# Patient Record
Sex: FEMALE | Race: OTHER | HISPANIC OR LATINO | URBAN - METROPOLITAN AREA
[De-identification: names, ages, dates, MRNs, and addresses within clinical notes are randomized per-mention and may not be internally consistent; named-entity substitution may affect disease eponyms.]

---

## 2018-02-03 ENCOUNTER — EMERGENCY (EMERGENCY)
Facility: HOSPITAL | Age: 38
LOS: 1 days | Discharge: HOME | End: 2018-02-03
Attending: EMERGENCY MEDICINE | Admitting: EMERGENCY MEDICINE
Payer: SELF-PAY

## 2018-02-03 ENCOUNTER — INPATIENT (INPATIENT)
Facility: HOSPITAL | Age: 38
LOS: 1 days | Discharge: HOME | DRG: 742 | End: 2018-02-05
Attending: PEDIATRICS | Admitting: PEDIATRICS
Payer: SELF-PAY

## 2018-02-03 VITALS
DIASTOLIC BLOOD PRESSURE: 74 MMHG | TEMPERATURE: 98 F | RESPIRATION RATE: 16 BRPM | HEART RATE: 99 BPM | OXYGEN SATURATION: 99 % | SYSTOLIC BLOOD PRESSURE: 110 MMHG

## 2018-02-03 VITALS — SYSTOLIC BLOOD PRESSURE: 89 MMHG | DIASTOLIC BLOOD PRESSURE: 73 MMHG | HEART RATE: 89 BPM

## 2018-02-03 VITALS
SYSTOLIC BLOOD PRESSURE: 96 MMHG | RESPIRATION RATE: 16 BRPM | DIASTOLIC BLOOD PRESSURE: 58 MMHG | OXYGEN SATURATION: 98 % | HEART RATE: 90 BPM | TEMPERATURE: 98 F

## 2018-02-03 DIAGNOSIS — R10.13 EPIGASTRIC PAIN: ICD-10-CM

## 2018-02-03 DIAGNOSIS — N73.9 FEMALE PELVIC INFLAMMATORY DISEASE, UNSPECIFIED: ICD-10-CM

## 2018-02-03 DIAGNOSIS — N83.291 OTHER OVARIAN CYST, RIGHT SIDE: ICD-10-CM

## 2018-02-03 DIAGNOSIS — Z98.890 OTHER SPECIFIED POSTPROCEDURAL STATES: Chronic | ICD-10-CM

## 2018-02-03 DIAGNOSIS — K65.0 GENERALIZED (ACUTE) PERITONITIS: ICD-10-CM

## 2018-02-03 LAB
ALBUMIN SERPL ELPH-MCNC: 3.3 G/DL — SIGNIFICANT CHANGE UP (ref 3.3–5)
ALBUMIN SERPL ELPH-MCNC: 3.6 G/DL — SIGNIFICANT CHANGE UP (ref 3.4–5)
ALP SERPL-CCNC: 44 U/L — SIGNIFICANT CHANGE UP (ref 40–120)
ALP SERPL-CCNC: 60 U/L — SIGNIFICANT CHANGE UP (ref 40–120)
ALT FLD-CCNC: 11 U/L — SIGNIFICANT CHANGE UP (ref 10–45)
ALT FLD-CCNC: 19 U/L — SIGNIFICANT CHANGE UP (ref 12–42)
ANION GAP SERPL CALC-SCNC: 10 MMOL/L — SIGNIFICANT CHANGE UP (ref 9–16)
ANION GAP SERPL CALC-SCNC: 15 MMOL/L — SIGNIFICANT CHANGE UP (ref 5–17)
APPEARANCE UR: CLEAR — SIGNIFICANT CHANGE UP
APTT BLD: 26.8 SEC — LOW (ref 27.5–36.5)
AST SERPL-CCNC: 14 U/L — SIGNIFICANT CHANGE UP (ref 10–40)
AST SERPL-CCNC: 16 U/L — SIGNIFICANT CHANGE UP (ref 15–37)
BASE EXCESS BLDV CALC-SCNC: -3.6 MMOL/L — SIGNIFICANT CHANGE UP
BASOPHILS NFR BLD AUTO: 0.1 % — SIGNIFICANT CHANGE UP (ref 0–2)
BILIRUB SERPL-MCNC: 0.7 MG/DL — SIGNIFICANT CHANGE UP (ref 0.2–1.2)
BILIRUB SERPL-MCNC: 0.8 MG/DL — SIGNIFICANT CHANGE UP (ref 0.2–1.2)
BILIRUB UR-MCNC: NEGATIVE — SIGNIFICANT CHANGE UP
BLD GP AB SCN SERPL QL: NEGATIVE — SIGNIFICANT CHANGE UP
BUN SERPL-MCNC: 11 MG/DL — SIGNIFICANT CHANGE UP (ref 7–23)
BUN SERPL-MCNC: 4 MG/DL — LOW (ref 7–23)
CALCIUM SERPL-MCNC: 7.8 MG/DL — LOW (ref 8.4–10.5)
CALCIUM SERPL-MCNC: 9.7 MG/DL — SIGNIFICANT CHANGE UP (ref 8.5–10.5)
CHLORIDE SERPL-SCNC: 103 MMOL/L — SIGNIFICANT CHANGE UP (ref 96–108)
CHLORIDE SERPL-SCNC: 99 MMOL/L — SIGNIFICANT CHANGE UP (ref 96–108)
CO2 SERPL-SCNC: 21 MMOL/L — LOW (ref 22–31)
CO2 SERPL-SCNC: 26 MMOL/L — SIGNIFICANT CHANGE UP (ref 22–31)
COLOR SPEC: YELLOW — SIGNIFICANT CHANGE UP
CREAT SERPL-MCNC: 0.67 MG/DL — SIGNIFICANT CHANGE UP (ref 0.5–1.3)
CREAT SERPL-MCNC: 0.81 MG/DL — SIGNIFICANT CHANGE UP (ref 0.5–1.3)
DIFF PNL FLD: (no result)
EOSINOPHIL NFR BLD AUTO: 0.1 % — SIGNIFICANT CHANGE UP (ref 0–6)
GAS PNL BLDV: SIGNIFICANT CHANGE UP
GLUCOSE SERPL-MCNC: 113 MG/DL — HIGH (ref 70–99)
GLUCOSE SERPL-MCNC: 131 MG/DL — HIGH (ref 70–99)
GLUCOSE UR QL: NEGATIVE — SIGNIFICANT CHANGE UP
HCG SERPL-ACNC: <.1 MIU/ML — SIGNIFICANT CHANGE UP
HCG UR QL: NEGATIVE — SIGNIFICANT CHANGE UP
HCO3 BLDV-SCNC: 22 MMOL/L — SIGNIFICANT CHANGE UP (ref 20–27)
HCT VFR BLD CALC: 32.5 % — LOW (ref 34.5–45)
HCT VFR BLD CALC: 38 % — SIGNIFICANT CHANGE UP (ref 34.5–45)
HGB BLD-MCNC: 11 G/DL — LOW (ref 11.5–15.5)
HGB BLD-MCNC: 12.6 G/DL — SIGNIFICANT CHANGE UP (ref 11.5–15.5)
INR BLD: 1.27 — HIGH (ref 0.88–1.16)
KETONES UR-MCNC: NEGATIVE — SIGNIFICANT CHANGE UP
LACTATE SERPL-SCNC: 0.8 MMOL/L — SIGNIFICANT CHANGE UP (ref 0.4–2)
LACTATE SERPL-SCNC: 1 MMOL/L — SIGNIFICANT CHANGE UP (ref 0.4–2)
LACTATE SERPL-SCNC: 1.6 MMOL/L — SIGNIFICANT CHANGE UP (ref 0.5–2)
LEUKOCYTE ESTERASE UR-ACNC: (no result)
LIDOCAIN IGE QN: 31 U/L — SIGNIFICANT CHANGE UP (ref 7–60)
LIDOCAIN IGE QN: 89 U/L — SIGNIFICANT CHANGE UP (ref 73–393)
LYMPHOCYTES # BLD AUTO: 11.1 % — LOW (ref 13–44)
LYMPHOCYTES # BLD AUTO: 4 % — LOW (ref 13–44)
MAGNESIUM SERPL-MCNC: 1.9 MG/DL — SIGNIFICANT CHANGE UP (ref 1.6–2.6)
MCHC RBC-ENTMCNC: 29 PG — SIGNIFICANT CHANGE UP (ref 27–34)
MCHC RBC-ENTMCNC: 29.3 PG — SIGNIFICANT CHANGE UP (ref 27–34)
MCHC RBC-ENTMCNC: 33.2 G/DL — SIGNIFICANT CHANGE UP (ref 32–36)
MCHC RBC-ENTMCNC: 33.8 G/DL — SIGNIFICANT CHANGE UP (ref 32–36)
MCV RBC AUTO: 86.7 FL — SIGNIFICANT CHANGE UP (ref 80–100)
MCV RBC AUTO: 87.4 FL — SIGNIFICANT CHANGE UP (ref 80–100)
MONOCYTES NFR BLD AUTO: 4.9 % — SIGNIFICANT CHANGE UP (ref 2–14)
MONOCYTES NFR BLD AUTO: 5 % — SIGNIFICANT CHANGE UP (ref 2–14)
NEUTROPHILS NFR BLD AUTO: 26 % — LOW (ref 43–77)
NEUTROPHILS NFR BLD AUTO: 83.8 % — HIGH (ref 43–77)
NITRITE UR-MCNC: NEGATIVE — SIGNIFICANT CHANGE UP
PCO2 BLDV: 39 MMHG — LOW (ref 41–51)
PH BLDV: 7.36 — SIGNIFICANT CHANGE UP (ref 7.32–7.43)
PH UR: 6 — SIGNIFICANT CHANGE UP (ref 5–8)
PLATELET # BLD AUTO: 233 K/UL — SIGNIFICANT CHANGE UP (ref 150–400)
PLATELET # BLD AUTO: 283 K/UL — SIGNIFICANT CHANGE UP (ref 150–400)
PO2 BLDV: 43 MMHG — SIGNIFICANT CHANGE UP
POTASSIUM SERPL-MCNC: 3.6 MMOL/L — SIGNIFICANT CHANGE UP (ref 3.5–5.3)
POTASSIUM SERPL-MCNC: 3.8 MMOL/L — SIGNIFICANT CHANGE UP (ref 3.5–5.3)
POTASSIUM SERPL-SCNC: 3.6 MMOL/L — SIGNIFICANT CHANGE UP (ref 3.5–5.3)
POTASSIUM SERPL-SCNC: 3.8 MMOL/L — SIGNIFICANT CHANGE UP (ref 3.5–5.3)
PROT SERPL-MCNC: 6.7 G/DL — SIGNIFICANT CHANGE UP (ref 6–8.3)
PROT SERPL-MCNC: 8.3 G/DL — HIGH (ref 6.4–8.2)
PROT UR-MCNC: NEGATIVE MG/DL — SIGNIFICANT CHANGE UP
PROTHROM AB SERPL-ACNC: 14 SEC — HIGH (ref 9.8–12.7)
RBC # BLD: 3.75 M/UL — LOW (ref 3.8–5.2)
RBC # BLD: 4.35 M/UL — SIGNIFICANT CHANGE UP (ref 3.8–5.2)
RBC # FLD: 12.5 % — SIGNIFICANT CHANGE UP (ref 10.3–16.9)
RBC # FLD: 13.2 % — SIGNIFICANT CHANGE UP (ref 10.3–16.9)
RH IG SCN BLD-IMP: POSITIVE — SIGNIFICANT CHANGE UP
SAO2 % BLDV: 74 % — SIGNIFICANT CHANGE UP
SODIUM SERPL-SCNC: 135 MMOL/L — SIGNIFICANT CHANGE UP (ref 132–145)
SODIUM SERPL-SCNC: 139 MMOL/L — SIGNIFICANT CHANGE UP (ref 135–145)
SP GR SPEC: 1.01 — SIGNIFICANT CHANGE UP (ref 1–1.03)
UROBILINOGEN FLD QL: 0.2 E.U./DL — SIGNIFICANT CHANGE UP
WBC # BLD: 13.7 K/UL — HIGH (ref 3.8–10.5)
WBC # BLD: 14.7 K/UL — HIGH (ref 3.8–10.5)
WBC # FLD AUTO: 13.7 K/UL — HIGH (ref 3.8–10.5)
WBC # FLD AUTO: 14.7 K/UL — HIGH (ref 3.8–10.5)

## 2018-02-03 PROCEDURE — 99285 EMERGENCY DEPT VISIT HI MDM: CPT | Mod: 25

## 2018-02-03 PROCEDURE — 71045 X-RAY EXAM CHEST 1 VIEW: CPT | Mod: 26

## 2018-02-03 PROCEDURE — 76830 TRANSVAGINAL US NON-OB: CPT | Mod: 26

## 2018-02-03 PROCEDURE — 99053 MED SERV 10PM-8AM 24 HR FAC: CPT

## 2018-02-03 PROCEDURE — 99285 EMERGENCY DEPT VISIT HI MDM: CPT

## 2018-02-03 PROCEDURE — 74177 CT ABD & PELVIS W/CONTRAST: CPT | Mod: 26

## 2018-02-03 PROCEDURE — 44970 LAPAROSCOPY APPENDECTOMY: CPT

## 2018-02-03 RX ORDER — MORPHINE SULFATE 50 MG/1
4 CAPSULE, EXTENDED RELEASE ORAL ONCE
Qty: 0 | Refills: 0 | Status: DISCONTINUED | OUTPATIENT
Start: 2018-02-03 | End: 2018-02-03

## 2018-02-03 RX ORDER — METRONIDAZOLE 500 MG
500 TABLET ORAL EVERY 8 HOURS
Qty: 0 | Refills: 0 | Status: DISCONTINUED | OUTPATIENT
Start: 2018-02-03 | End: 2018-02-05

## 2018-02-03 RX ORDER — MORPHINE SULFATE 50 MG/1
4 CAPSULE, EXTENDED RELEASE ORAL
Qty: 0 | Refills: 0 | Status: DISCONTINUED | OUTPATIENT
Start: 2018-02-03 | End: 2018-02-04

## 2018-02-03 RX ORDER — ACETAMINOPHEN 500 MG
650 TABLET ORAL ONCE
Qty: 0 | Refills: 0 | Status: COMPLETED | OUTPATIENT
Start: 2018-02-03 | End: 2018-02-03

## 2018-02-03 RX ORDER — SODIUM CHLORIDE 9 MG/ML
1000 INJECTION INTRAMUSCULAR; INTRAVENOUS; SUBCUTANEOUS ONCE
Qty: 0 | Refills: 0 | Status: COMPLETED | OUTPATIENT
Start: 2018-02-03 | End: 2018-02-03

## 2018-02-03 RX ORDER — ONDANSETRON 8 MG/1
4 TABLET, FILM COATED ORAL ONCE
Qty: 0 | Refills: 0 | Status: DISCONTINUED | OUTPATIENT
Start: 2018-02-03 | End: 2018-02-04

## 2018-02-03 RX ORDER — SODIUM CHLORIDE 9 MG/ML
1000 INJECTION, SOLUTION INTRAVENOUS
Qty: 0 | Refills: 0 | Status: DISCONTINUED | OUTPATIENT
Start: 2018-02-03 | End: 2018-02-04

## 2018-02-03 RX ORDER — FAMOTIDINE 10 MG/ML
20 INJECTION INTRAVENOUS ONCE
Qty: 0 | Refills: 0 | Status: COMPLETED | OUTPATIENT
Start: 2018-02-03 | End: 2018-02-03

## 2018-02-03 RX ORDER — IOHEXOL 300 MG/ML
50 INJECTION, SOLUTION INTRAVENOUS ONCE
Qty: 0 | Refills: 0 | Status: COMPLETED | OUTPATIENT
Start: 2018-02-03 | End: 2018-02-03

## 2018-02-03 RX ORDER — KETOROLAC TROMETHAMINE 30 MG/ML
30 SYRINGE (ML) INJECTION ONCE
Qty: 0 | Refills: 0 | Status: DISCONTINUED | OUTPATIENT
Start: 2018-02-03 | End: 2018-02-04

## 2018-02-03 RX ORDER — PIPERACILLIN AND TAZOBACTAM 4; .5 G/20ML; G/20ML
3.38 INJECTION, POWDER, LYOPHILIZED, FOR SOLUTION INTRAVENOUS ONCE
Qty: 0 | Refills: 0 | Status: COMPLETED | OUTPATIENT
Start: 2018-02-03 | End: 2018-02-03

## 2018-02-03 RX ORDER — SODIUM CHLORIDE 9 MG/ML
1000 INJECTION INTRAMUSCULAR; INTRAVENOUS; SUBCUTANEOUS
Qty: 0 | Refills: 0 | Status: DISCONTINUED | OUTPATIENT
Start: 2018-02-03 | End: 2018-02-03

## 2018-02-03 RX ORDER — ONDANSETRON 8 MG/1
4 TABLET, FILM COATED ORAL ONCE
Qty: 0 | Refills: 0 | Status: COMPLETED | OUTPATIENT
Start: 2018-02-03 | End: 2018-02-03

## 2018-02-03 RX ORDER — ACETAMINOPHEN 500 MG
650 TABLET ORAL EVERY 6 HOURS
Qty: 0 | Refills: 0 | Status: DISCONTINUED | OUTPATIENT
Start: 2018-02-03 | End: 2018-02-05

## 2018-02-03 RX ORDER — MORPHINE SULFATE 50 MG/1
2 CAPSULE, EXTENDED RELEASE ORAL ONCE
Qty: 0 | Refills: 0 | Status: DISCONTINUED | OUTPATIENT
Start: 2018-02-03 | End: 2018-02-03

## 2018-02-03 RX ORDER — CEFOTETAN DISODIUM 1 G
2 VIAL (EA) INJECTION EVERY 12 HOURS
Qty: 0 | Refills: 0 | Status: DISCONTINUED | OUTPATIENT
Start: 2018-02-04 | End: 2018-02-05

## 2018-02-03 RX ORDER — METRONIDAZOLE 500 MG
TABLET ORAL
Qty: 0 | Refills: 0 | Status: DISCONTINUED | OUTPATIENT
Start: 2018-02-03 | End: 2018-02-03

## 2018-02-03 RX ADMIN — IOHEXOL 50 MILLILITER(S): 300 INJECTION, SOLUTION INTRAVENOUS at 06:10

## 2018-02-03 RX ADMIN — SODIUM CHLORIDE 100 MILLILITER(S): 9 INJECTION INTRAMUSCULAR; INTRAVENOUS; SUBCUTANEOUS at 15:10

## 2018-02-03 RX ADMIN — MORPHINE SULFATE 4 MILLIGRAM(S): 50 CAPSULE, EXTENDED RELEASE ORAL at 19:25

## 2018-02-03 RX ADMIN — Medication 110 MILLIGRAM(S): at 19:29

## 2018-02-03 RX ADMIN — MORPHINE SULFATE 4 MILLIGRAM(S): 50 CAPSULE, EXTENDED RELEASE ORAL at 09:48

## 2018-02-03 RX ADMIN — SODIUM CHLORIDE 1000 MILLILITER(S): 9 INJECTION INTRAMUSCULAR; INTRAVENOUS; SUBCUTANEOUS at 07:38

## 2018-02-03 RX ADMIN — ONDANSETRON 4 MILLIGRAM(S): 8 TABLET, FILM COATED ORAL at 06:10

## 2018-02-03 RX ADMIN — PIPERACILLIN AND TAZOBACTAM 200 GRAM(S): 4; .5 INJECTION, POWDER, LYOPHILIZED, FOR SOLUTION INTRAVENOUS at 09:40

## 2018-02-03 RX ADMIN — MORPHINE SULFATE 2 MILLIGRAM(S): 50 CAPSULE, EXTENDED RELEASE ORAL at 06:10

## 2018-02-03 RX ADMIN — MORPHINE SULFATE 2 MILLIGRAM(S): 50 CAPSULE, EXTENDED RELEASE ORAL at 07:23

## 2018-02-03 RX ADMIN — Medication 500 MILLIGRAM(S): at 20:02

## 2018-02-03 RX ADMIN — SODIUM CHLORIDE 1000 MILLILITER(S): 9 INJECTION INTRAMUSCULAR; INTRAVENOUS; SUBCUTANEOUS at 11:00

## 2018-02-03 RX ADMIN — SODIUM CHLORIDE 1000 MILLILITER(S): 9 INJECTION INTRAMUSCULAR; INTRAVENOUS; SUBCUTANEOUS at 06:10

## 2018-02-03 RX ADMIN — MORPHINE SULFATE 4 MILLIGRAM(S): 50 CAPSULE, EXTENDED RELEASE ORAL at 15:11

## 2018-02-03 RX ADMIN — MORPHINE SULFATE 4 MILLIGRAM(S): 50 CAPSULE, EXTENDED RELEASE ORAL at 19:29

## 2018-02-03 RX ADMIN — SODIUM CHLORIDE 1000 MILLILITER(S): 9 INJECTION INTRAMUSCULAR; INTRAVENOUS; SUBCUTANEOUS at 09:53

## 2018-02-03 RX ADMIN — MORPHINE SULFATE 4 MILLIGRAM(S): 50 CAPSULE, EXTENDED RELEASE ORAL at 15:48

## 2018-02-03 RX ADMIN — Medication 650 MILLIGRAM(S): at 09:52

## 2018-02-03 RX ADMIN — FAMOTIDINE 20 MILLIGRAM(S): 10 INJECTION INTRAVENOUS at 06:10

## 2018-02-03 NOTE — ED ADULT NURSE REASSESSMENT NOTE - NS ED NURSE REASSESS COMMENT FT1
pt cleared by MD Quach, is A+Ox3 and is okay to be transferred from [facility] to [facility] ED
Received patient from night shift RN. Patient is A,A&Ox3, in NAD and will continue to monitor. Patient reports mild HA and abdominal pain. CT result pending.

## 2018-02-03 NOTE — ED PROVIDER NOTE - OBJECTIVE STATEMENT
36 yo , transferred from [facility] , discovered suspician for intermittent ovarian torsion on imaging at [facility], pain still present but improved dfrom earlier, pt reports intermittent colicky severe rt sided abd pain for over a week. OB notified immediately on pt's arrival to ER. pt requests additional pain medications.

## 2018-02-03 NOTE — PATIENT PROFILE ADULT. - --DESCRIBE SURGICAL SITE
has 4 lap. sites on abdominal area with 3 lap. sites with gauze/tegaderm dressings( clean , dry & intact) & one over umbilicus( saturated)

## 2018-02-03 NOTE — ED PROVIDER NOTE - PROGRESS NOTE DETAILS
rechecked vitals- pt is mildly  hypotensive and febrile. Abd exam is extremely tender to all quadrants with guarding. surgical abdomen? +UTI. Sepsis protocol initiated- lactate and blood cultures and INR sent, 2nd line started- 2L NS running. tylenol and morphine given. Called CT for stat read- showing possible torsion to R ovary with 4cm cyst. Getting stat transvaginal US per Dr. Quach. Then pt likeely to be sent lights and sirens to [facility] for surgery  Additional PMHx: pt is  and has a hx of ovarian cysts- denies and vaginal discharge or urinary sxs.   States all sxs began last night. Only endorsing abd pain at this time. Nicolas N/V/D/C rechecked vitals- pt is mildly  hypotensive and febrile. Abd exam is extremely tender to all quadrants with guarding. surgical abdomen? Ruptured heidi? pan-colitis? Unclear etiology of pain- but states it is the worst at epigastrium. Pending CT result. +UTI. Sepsis protocol initiated- lactate and blood cultures and INR sent, 2nd line started- 2L NS running. tylenol and morphine given. Called CT for stat read- showing possible torsion to R ovary with 4cm cyst. Getting stat transvaginal US per Dr. Quach. Then pt likely to be sent lights and sirens to [facility] for surgery  Additional PMHx: pt is  and has a hx of ovarian cysts- denies and vaginal discharge or urinary sxs.   States all sxs began last night. Only endorsing abd pain at this time. Nicolas N/V/D/C Discussed case with Dr. Mir of OBGYN- will send pt lights and sirens to [facility] where OBGYN is expecting her. Also signed case out to Dr. Dodd ([facility])- aware of case and expecting. Main (attending note):  received sign out from night team ~ 830 am pending CT scan.  Patient with diffuse abdominal tenderness, high bandemia, high white count, infected urine (no urinary symptoms).  CT scan concern for ovarian pathology.  Obtain STAT US which shows enlarged R ovary with + flow but edematous likely from intermittent torsion.  Hx of ovarian cyst, monogamous, no hx of STIs.  Pelvic performed with R adnexal tenderness.  Spoke with GYN attending Dr. Conner and Dr. Dodd from ED for transfer and evaluation.  Symptoms improved after pain medication, fluids.  Covered with ceftriaxone.  Lactate wnl x 2 BP 89/73- running another L IVF. NO tachycardiac, tachypnea, diaphoresis. States her pain has not gotten any worse. WIll call Dr. Dodd at [facility] and make her aware that the pressure have slowly been declining and pt has not been started on any pressure- all other vitals normal. HASEEB Montoya: BP 89/73- running another L IVF. NO tachycardiac, tachypnea, diaphoresis. States her pain has not gotten any worse. WIll call Dr. Dodd at [facility] and make her aware that the pressure have slowly been declining and pt has not been started on any pressure- all other vitals normal. HASEEB Montoya Additional note: I completed Pelvic exam (assisted by KEO Alfaro) immediately s/p wet read of CT. pt had + R adnexal tenderness. no L adnexal tenderness. no CMT. No vaginal discharge. Cervix closed and normal appearing.

## 2018-02-03 NOTE — ED PROVIDER NOTE - CONSTITUTIONAL, MLM
normal... Well appearing, well nourished, awake, alert, oriented to person, place, time/situation  appears uncomfortable

## 2018-02-03 NOTE — ED ADULT NURSE REASSESSMENT NOTE - NS ED NURSE REASSESS COMMENT FT1
Patient a/oX 3, anxious, c/o epigastric and abdominal cramping pain, no nausea/vomitting, no diarrhea, no urinary difficulties/pain.  Vital signs stable, NSR on EKg.  SEnt from [facility] for further work-up to r/o ovarian torsion.  Arrives w/ PV #20 to right aC and Left wrist, patent.  Morphine 4mg IVP adminsitered, NSS 1 L bolus ongoing, tolerating well.  NPO observed;  as per family last meal 1900H last night.  Pelvic exam done and seen by OB Gyne.  Additional labs sent.  To be admitted and brought to OR in stable condition w/ RN and transporter.  Consents signed for OR procedure.

## 2018-02-03 NOTE — ED PROVIDER NOTE - MEDICAL DECISION MAKING DETAILS
36 y/o F p/w abdo pain and nausea since waking up at 2:00am this morning. Diffusely tender on exam. Labs, IVFs, Pepcid, Zofran and Morhpine were administered in ED. CTAP w/ contrast ordered 36 y/o F p/w abdo pain and nausea since waking up at 2:00am this morning. Diffusely tender on exam. Labs, IVFs, Pepcid, Zofran and Morhpine were administered in ED. WBC count 14.7. CTAP w/ contrast ordered 38 y/o F p/w abdo pain and nausea since waking up at 2:00am this morning. Diffusely tender on exam. Labs, IVFs, Pepcid, Zofran and Morhpine were administered in ED. WBC count 14.7 w/ Band Neutrophils 61.0. CTAP w/ PO/IV contrast ordered

## 2018-02-03 NOTE — H&P ADULT - ASSESSMENT
37y P3 with acute onside of abdominal pain, notable increased in right side  - pt with guarding and severe pain suspicious for torsion  - admit GYN  - OR for diagnostic l/s    d/w Dr. Mir

## 2018-02-03 NOTE — ED ADULT NURSE NOTE - OBJECTIVE STATEMENT
Patient states was awaken from sleep due to severe abdominal and epigastric pain, no nausea, no diarrhea, no vomititng, no dysuria or urinary difficulties, no fever.

## 2018-02-03 NOTE — ED PROVIDER NOTE - OBJECTIVE STATEMENT
36 y/o F with no known significant PMH presents c/o constant, burning epigastric pain with nausea that awoke her from sleep at 2:00am this morning. Pain is moderate to severe in nature with no aggravating/alleviating factors. She has not taken any medications for her sx's. Her last PO intake was 19:00pm last night which she had tolerated well. Last BM was yesterday at 15:00pm with no recent change in BM patterns.    Denies fever, chills, dizziness, syncope, CP, SOB, palpitations, vomiting, hematemesis, diarrhea, hematochezia, melena, back pain, dysuria, hematuria

## 2018-02-03 NOTE — BRIEF OPERATIVE NOTE - OPERATION/FINDINGS
right ovary with 4cm simple cyst with clear fluid attached to abdominal wall. puss in abdomen. no visible area of accumulation.

## 2018-02-03 NOTE — H&P ADULT - HISTORY OF PRESENT ILLNESS
37y P3 presenting for acute abdominal pain that is unremitting. Pt presented to [facility] with pain. THey preformed TVUS and CT that showed possible torsion vs. prior torsion. Exam at [facility] ED is significant for guarding and severe pain on bimanual. Right ovarian cyst noted on both US and CT. Decision to bring to OR made by attending. Denies fever, chills, nausea/vomitting.        PAST MEDICAL & SURGICAL HISTORY:  No pertinent past medical history    MEDICATIONS  (STANDING):  morphine  - Injectable 4 milliGRAM(s) IV Push Once  sodium chloride 0.9%. 1000 milliLiter(s) (100 mL/Hr) IV Continuous <Continuous>    MEDICATIONS  (PRN):      Allergies    No Known Allergies    Intolerances 37y P3 presenting for acute abdominal pain that is unremitting. [facility] called Ma attending at 1200. Pt presented to [facility] with pain . They preformed TVUS and CT that showed possible torsion vs. prior torsion. Pt presented to [facility] at 1400. Exam at [facility] ED is significant for guarding and severe pain on bimanual. Right ovarian cyst noted on both US and CT. Decision to bring to OR made by attending and was called immediately Denies fever, chills, nausea/vomitting.        PAST MEDICAL & SURGICAL HISTORY:  No pertinent past medical history    MEDICATIONS  (STANDING):  morphine  - Injectable 4 milliGRAM(s) IV Push Once  sodium chloride 0.9%. 1000 milliLiter(s) (100 mL/Hr) IV Continuous <Continuous>    MEDICATIONS  (PRN):      Allergies    No Known Allergies    Intolerances

## 2018-02-03 NOTE — BRIEF OPERATIVE NOTE - POST-OP DX
Abdominal infection  02/03/2018    Active  Lois Rees  Cyst of right ovary  02/03/2018    Active  Lois Rees

## 2018-02-03 NOTE — ED ADULT NURSE REASSESSMENT NOTE - COMFORT CARE
darkened lights/plan of care explained/wait time explained
darkened lights/plan of care explained/wait time explained

## 2018-02-03 NOTE — ED PROVIDER NOTE - MEDICAL DECISION MAKING DETAILS
pt admitted to OB for intermittent ovarian torsion, pt was given antibiotics empirically due to elevated bands , suspect this more likely secondary to ischemic ovary.

## 2018-02-03 NOTE — H&P ADULT - NSHPLABSRESULTS_GEN_ALL_CORE
LABS:                        11.0   13.7  )-----------( 233      ( 2018 14:10 )             32.5         135  |  99  |  11  ----------------------------<  131<H>  3.8   |  26  |  0.81    Ca    9.7      2018 05:59    TPro  8.3<H>  /  Alb  3.6  /  TBili  0.7  /  DBili  x   /  AST  16  /  ALT  19  /  AlkPhos  60      PT/INR - ( 2018 11:21 )   PT: 14.0 sec;   INR: 1.27          PTT - ( 2018 11:21 )  PTT:26.8 sec  Urinalysis Basic - ( 2018 07:25 )    Color: Yellow / Appearance: Clear / S.015 / pH: x  Gluc: x / Ketone: NEGATIVE  / Bili: NEGATIVE / Urobili: 0.2 E.U./dL   Blood: x / Protein: NEGATIVE mg/dL / Nitrite: NEGATIVE   Leuk Esterase: Large / RBC: < 5 /HPF / WBC Many /HPF   Sq Epi: x / Non Sq Epi: Few /HPF / Bacteria: Many /HPF        RADIOLOGY & ADDITIONAL STUDIES:  < from: US Transvaginal (18 @ 12:12) >    EXAM:  US TRANSVAGINAL                           PROCEDURE DATE:  2018                     INTERPRETATION:  CLINICAL INDICATION: 37-year-old with the right ovarian   mass.        FINDINGS: Transabdominal and endovaginal ultrasound of the pelvis was   performed. No prior studies are available for review. Transabdominal   ultrasound demonstrates that the uterus measures 10.4 x 4.6 x 5.9 cm.   Endovaginal ultrasound was performed for improved visualization of the   ovaries demonstrating endometrial thickness of 7 mm, appropriate for a   menstruating patient of this age. There is a hyperechoic nodule small at   the fundal margin of the endometrium measuring 5 mm with internal   vascularity; additional hyperechoic nodule at the anterior body margin of   the endometrium measuring 7 x 5 mm with internal vascularity. The left   ovary measures 3.5 x 2.1 x 2.3 cm; arterial and venous vascular was   documented on Doppler interrogation. The right ovary was best documented   transabdominally measuring 7.0 x 5.0 x 4.4 cm and contains a cyst   measuring 3.3 cm with a diffusely hypoechoic ovarian parenchyma; arterial   and venous vascularity was documented on Doppler interrogation. No   significant pelvic fluid.          IMPRESSION:    Negative for current ovarian torsion with possible recent right ovarian   detorsion given enlarged edematous right ovary.    Several small endometrial polyps.                "Thank you for the opportunity to participate in the care of this   patient."        BULMARO CONRAD M.D., ATTENDING RADIOLOGIST  This document has been electronically signed.  Feb  3 2018 11:54AM             < end of copied text >

## 2018-02-03 NOTE — H&P ADULT - NSHPPHYSICALEXAM_GEN_ALL_CORE
PHYSICAL EXAM:   Vital Signs Last 24 Hrs  T(C): 36.8 (03 Feb 2018 13:40), Max: 38.1 (03 Feb 2018 09:15)  T(F): 98.3 (03 Feb 2018 13:40), Max: 100.5 (03 Feb 2018 09:15)  HR: 90 (03 Feb 2018 13:40) (78 - 99)  BP: 96/58 (03 Feb 2018 13:40) (89/73 - 110/74)  BP(mean): --  RR: 16 (03 Feb 2018 13:40) (16 - 18)  SpO2: 98% (03 Feb 2018 13:40) (98% - 99%)    **************************  Constitutional: Alert & Oriented x3, No acute distress, cooperative   Respiratory: Clear to ausculation bilaterally; no wheezing, rhonchi, or crackles  Cardiovascular: S1 &S2 physiologic, no murmurs, or gallops  Gastrointestinal: soft, tender, positive bowel sounds, no rebound but guarding in all 4 quadrents  Speculum exam: closed cervix, no abnormal discharge  Pelvic exam: severe pain on right side with bimanual  Extremities: no calf tenderness or swelling

## 2018-02-03 NOTE — ED ADULT NURSE REASSESSMENT NOTE - NS ED NURSE REASSESS COMMENT FT1
Patient brought up to OR in stable condition, endorsement report and care received at bedside by OR RN Jens Giordano.

## 2018-02-03 NOTE — BRIEF OPERATIVE NOTE - PROCEDURE
<<-----Click on this checkbox to enter Procedure Abdominal washout  02/03/2018    Active  LONNIE  Ovarian cystectomy  02/03/2018    Active  LONNIE

## 2018-02-03 NOTE — ED ADULT NURSE NOTE - CHPI ED SYMPTOMS NEG
no diarrhea/no dysuria/no hematuria/no nausea/no fever/no chills/no blood in stool/no vomiting/no abdominal distension/no burning urination

## 2018-02-04 LAB
C DIFF GDH STL QL: NEGATIVE — SIGNIFICANT CHANGE UP
C DIFF GDH STL QL: SIGNIFICANT CHANGE UP
CULTURE RESULTS: SIGNIFICANT CHANGE UP
GRAM STN FLD: SIGNIFICANT CHANGE UP
GRAM STN FLD: SIGNIFICANT CHANGE UP
HCT VFR BLD CALC: 30.7 % — LOW (ref 34.5–45)
HGB BLD-MCNC: 9.9 G/DL — LOW (ref 11.5–15.5)
MCHC RBC-ENTMCNC: 28.4 PG — SIGNIFICANT CHANGE UP (ref 27–34)
MCHC RBC-ENTMCNC: 32.2 G/DL — SIGNIFICANT CHANGE UP (ref 32–36)
MCV RBC AUTO: 88 FL — SIGNIFICANT CHANGE UP (ref 80–100)
PLATELET # BLD AUTO: 244 K/UL — SIGNIFICANT CHANGE UP (ref 150–400)
RBC # BLD: 3.49 M/UL — LOW (ref 3.8–5.2)
RBC # FLD: 13.1 % — SIGNIFICANT CHANGE UP (ref 10.3–16.9)
SPECIMEN SOURCE: SIGNIFICANT CHANGE UP
WBC # BLD: 9 K/UL — SIGNIFICANT CHANGE UP (ref 3.8–10.5)
WBC # FLD AUTO: 9 K/UL — SIGNIFICANT CHANGE UP (ref 3.8–10.5)

## 2018-02-04 RX ORDER — IBUPROFEN 200 MG
600 TABLET ORAL EVERY 6 HOURS
Qty: 0 | Refills: 0 | Status: DISCONTINUED | OUTPATIENT
Start: 2018-02-04 | End: 2018-02-05

## 2018-02-04 RX ADMIN — Medication 500 MILLIGRAM(S): at 21:18

## 2018-02-04 RX ADMIN — Medication 500 MILLIGRAM(S): at 14:01

## 2018-02-04 RX ADMIN — Medication 650 MILLIGRAM(S): at 05:45

## 2018-02-04 RX ADMIN — Medication 600 MILLIGRAM(S): at 19:40

## 2018-02-04 RX ADMIN — Medication 650 MILLIGRAM(S): at 06:30

## 2018-02-04 RX ADMIN — Medication 650 MILLIGRAM(S): at 14:03

## 2018-02-04 RX ADMIN — Medication 650 MILLIGRAM(S): at 14:35

## 2018-02-04 RX ADMIN — Medication 500 MILLIGRAM(S): at 05:38

## 2018-02-04 RX ADMIN — Medication 100 GRAM(S): at 17:00

## 2018-02-04 RX ADMIN — Medication 110 MILLIGRAM(S): at 18:17

## 2018-02-04 RX ADMIN — Medication 110 MILLIGRAM(S): at 05:37

## 2018-02-04 RX ADMIN — Medication 100 GRAM(S): at 06:31

## 2018-02-04 RX ADMIN — Medication 600 MILLIGRAM(S): at 19:09

## 2018-02-04 RX ADMIN — SODIUM CHLORIDE 125 MILLILITER(S): 9 INJECTION, SOLUTION INTRAVENOUS at 03:00

## 2018-02-04 NOTE — PROGRESS NOTE ADULT - SUBJECTIVE AND OBJECTIVE BOX
Pt evaluated at bedside.  She reports pain is well controlled with OPM. Pt states the pain since surgery has progressivly gotten better. She has ambulated, voided and passed flatus.   She denies HA, dizzines, SOB, CP, palpitations, n/v.    T(C): 36.1 (02-04-18 @ 01:00), Max: 36.9 (02-03-18 @ 20:30)  HR: 79 (02-04-18 @ 01:00) (79 - 98)  BP: 89/59 (02-04-18 @ 01:00) (89/59 - 113/63)  RR: 18 (02-04-18 @ 01:00) (15 - 18)  SpO2: 100% (02-04-18 @ 01:00) (94% - 100%)  GA: A&Ox3  CV: RRR, no MRG  Pulm: CTAB  Abd: +BS, soft, NTND, no rebound or guarding  Extrem: SCDs in place    02-03 @ 07:01  -  02-04 @ 05:22  --------------------------------------------------------  IN: 2325 mL / OUT: 950 mL / NET: 1375 mL                              11.0   13.7  )-----------( 233      ( 03 Feb 2018 14:10 )             32.5     02-03    139  |  103  |  4<L>  ----------------------------<  113<H>  3.6   |  21<L>  |  0.67    Ca    7.8<L>      03 Feb 2018 14:10  Mg     1.9     02-03    TPro  6.7  /  Alb  3.3  /  TBili  0.8  /  DBili  x   /  AST  14  /  ALT  11  /  AlkPhos  44  02-03

## 2018-02-04 NOTE — PROGRESS NOTE ADULT - ASSESSMENT
37y Female POD# 1 s/p diagnostic laparoscopy                     regular diet  pain/nausea control prn  DVT ppx  Surgery will sign off reconsult if needed

## 2018-02-04 NOTE — PROGRESS NOTE ADULT - ASSESSMENT
37y Female POD#1 s/p diagnostic laparoscopy with abdominal infection concerning for PIC. On Doxy, cefotetan and flagyl.                                       1. Neuro/Pain:  OPM  2  CV:   VS per routine  3. Pulm: Encourage ISS  4. GI: Reg diet  5. :  voiding  6. Heme: stable  7. ID: suspected PID on cefotetan, doxy, flagyl. Diarrhea possible 2/2 abx, will send c diff.   8. DVT ppx: SCDs  9. Dispo: Likely POD#2

## 2018-02-04 NOTE — PROGRESS NOTE ADULT - SUBJECTIVE AND OBJECTIVE BOX
Pt evaluated at bedside.  She reports pain is well controlled with OPM. She is tolerating a regular diet. Patient reports she is having diarrhea, frequent loose bowel movements. Voiding and ambulating. She denies HA, dizzines, SOB, CP, palpitations, n/v.    Vital Signs Last 24 Hrs  T(C): 37.6 (04 Feb 2018 15:50), Max: 37.6 (04 Feb 2018 15:50)  T(F): 99.6 (04 Feb 2018 15:50), Max: 99.6 (04 Feb 2018 15:50)  HR: 108 (04 Feb 2018 15:50) (79 - 108)  BP: 89/51 (04 Feb 2018 15:50) (89/51 - 113/63)  BP(mean): 80 (03 Feb 2018 20:00) (75 - 84)  RR: 16 (04 Feb 2018 15:50) (15 - 18)  SpO2: 95% (04 Feb 2018 15:50) (94% - 100%)    GA: A&Ox3  CV: RRR, no MRG  Pulm: CTAB  Abd: +BS, soft, NTND, no rebound or guarding  Extrem: SCDs in place    I&O's Summary    03 Feb 2018 07:01  -  04 Feb 2018 07:00  --------------------------------------------------------  IN: 3040 mL / OUT: 1150 mL / NET: 1890 mL    04 Feb 2018 07:01  -  04 Feb 2018 16:11  --------------------------------------------------------  IN: 0 mL / OUT: 400 mL / NET: -400 mL                          9.9    9.0   )-----------( 244      ( 04 Feb 2018 07:33 )             30.7   02-03    139  |  103  |  4<L>  ----------------------------<  113<H>  3.6   |  21<L>  |  0.67    Ca    7.8<L>      03 Feb 2018 14:10  Mg     1.9     02-03    TPro  6.7  /  Alb  3.3  /  TBili  0.8  /  DBili  x   /  AST  14  /  ALT  11  /  AlkPhos  44  02-03 Pt evaluated at bedside.  She reports pain is well controlled with OPM. She is tolerating a regular diet. Patient reports she is having diarrhea, frequent loose bowel movements. Voiding and ambulating. She denies HA, dizzines, SOB, CP, palpitations, n/v.    Vital Signs Last 24 Hrs  T(C): 37.6 (04 Feb 2018 15:50), Max: 37.6 (04 Feb 2018 15:50)  T(F): 99.6 (04 Feb 2018 15:50), Max: 99.6 (04 Feb 2018 15:50)  HR: 108 (04 Feb 2018 15:50) (79 - 108)  BP: 89/51 (04 Feb 2018 15:50) (89/51 - 113/63)  BP(mean): 80 (03 Feb 2018 20:00) (75 - 84)  RR: 16 (04 Feb 2018 15:50) (15 - 18)  SpO2: 95% (04 Feb 2018 15:50) (94% - 100%)    GA: A&Ox3  CV: RRR, no MRG  Pulm: CTAB  Abd: +BS, soft, nontender, mild to moderately distended, no rebound or guarding  Extrem: SCDs in place    I&O's Summary    03 Feb 2018 07:01  -  04 Feb 2018 07:00  --------------------------------------------------------  IN: 3040 mL / OUT: 1150 mL / NET: 1890 mL    04 Feb 2018 07:01  -  04 Feb 2018 16:11  --------------------------------------------------------  IN: 0 mL / OUT: 400 mL / NET: -400 mL                          9.9    9.0   )-----------( 244      ( 04 Feb 2018 07:33 )             30.7   02-03    139  |  103  |  4<L>  ----------------------------<  113<H>  3.6   |  21<L>  |  0.67    Ca    7.8<L>      03 Feb 2018 14:10  Mg     1.9     02-03    TPro  6.7  /  Alb  3.3  /  TBili  0.8  /  DBili  x   /  AST  14  /  ALT  11  /  AlkPhos  44  02-03

## 2018-02-04 NOTE — PROGRESS NOTE ADULT - ASSESSMENT
37y Female POD#1    s/p diagnostic laparoscopy with abdominal infection concerning for PIC. On Doxy, cefotetan and flagyl.                                       1. Neuro/Pain:  OPM  2  CV:   VS per routine  3. Pulm: Encourage ISS  4. GI: Reg this morning  5. :  voiding  6. Heme: AM CBC  7. ID: --  8. DVT ppx: SCDs  9. Dispo: Likely POD#1-2, if afebrile possible DC after 24hrs of IV ABx. Discharge on PO medications

## 2018-02-04 NOTE — PROGRESS NOTE ADULT - SUBJECTIVE AND OBJECTIVE BOX
STATUS POST:  ovarian cystectomy and abdominal washout    POST OPERATIVE DAY #:  1    SUBJECTIVE: Denies any complaints, tolerating diet, passing flatus and BM. Denies N/V    MEDICATIONS  (STANDING):  cefoTEtan  IVPB 2 Gram(s) IV Intermittent every 12 hours  doxycycline IVPB 100 milliGRAM(s) IV Intermittent every 12 hours  doxycycline IVPB      lactated ringers. 1000 milliLiter(s) (125 mL/Hr) IV Continuous <Continuous>  metroNIDAZOLE    Tablet 500 milliGRAM(s) Oral every 8 hours    MEDICATIONS  (PRN):  acetaminophen   Tablet. 650 milliGRAM(s) Oral every 6 hours PRN Moderate Pain (4 - 6)  ketorolac   Injectable 30 milliGRAM(s) IV Push once PRN Moderate Pain  morphine  - Injectable 4 milliGRAM(s) IV Push every 15 minutes PRN breakthrough pain  ondansetron Injectable 4 milliGRAM(s) IV Push once PRN Postoperative Nausea and/or Vomiting      Vital Signs Last 24 Hrs  T(C): 36.3 (2018 05:50), Max: 37.6 (2018 15:10)  T(F): 97.3 (2018 05:50), Max: 99.7 (2018 15:10)  HR: 81 (2018 05:50) (78 - 98)  BP: 99/66 (2018 05:50) (89/59 - 116/72)  BP(mean): 80 (2018 20:00) (75 - 84)  RR: 18 (2018 05:50) (15 - 18)  SpO2: 96% (2018 05:50) (94% - 100%)    PHYSICAL EXAM:      Constitutional: A&Ox3      Respiratory: non labored breathing, no respiratory distress    Cardiovascular: NSR, RRR    Gastrointestinal: Soft ND,NT                 Incision: CDI    Genitourinary: voiding    Extremities: (-) edema                  I&O's Detail    2018 07:01  -  2018 07:00  --------------------------------------------------------  IN:    lactated ringers.: 1350 mL    Oral Fluid: 440 mL    Other: 1000 mL    Solution: 250 mL  Total IN: 3040 mL    OUT:    Voided: 1150 mL  Total OUT: 1150 mL    Total NET: 1890 mL          LABS:                        9.9    9.0   )-----------( 244      ( 2018 07:33 )             30.7     02-    139  |  103  |  4<L>  ----------------------------<  113<H>  3.6   |  21<L>  |  0.67    Ca    7.8<L>      2018 14:10  Mg     1.9         TPro  6.7  /  Alb  3.3  /  TBili  0.8  /  DBili  x   /  AST  14  /  ALT  11  /  AlkPhos  44  02-03    PT/INR - ( 2018 11:21 )   PT: 14.0 sec;   INR: 1.27          PTT - ( 2018 11:21 )  PTT:26.8 sec  Urinalysis Basic - ( 2018 07:25 )    Color: Yellow / Appearance: Clear / S.015 / pH: x  Gluc: x / Ketone: NEGATIVE  / Bili: NEGATIVE / Urobili: 0.2 E.U./dL   Blood: x / Protein: NEGATIVE mg/dL / Nitrite: NEGATIVE   Leuk Esterase: Large / RBC: < 5 /HPF / WBC Many /HPF   Sq Epi: x / Non Sq Epi: Few /HPF / Bacteria: Many /HPF        RADIOLOGY & ADDITIONAL STUDIES:

## 2018-02-05 VITALS
OXYGEN SATURATION: 96 % | RESPIRATION RATE: 17 BRPM | SYSTOLIC BLOOD PRESSURE: 111 MMHG | TEMPERATURE: 99 F | HEART RATE: 97 BPM | DIASTOLIC BLOOD PRESSURE: 62 MMHG

## 2018-02-05 LAB
ANION GAP SERPL CALC-SCNC: 11 MMOL/L — SIGNIFICANT CHANGE UP (ref 5–17)
BUN SERPL-MCNC: 7 MG/DL — SIGNIFICANT CHANGE UP (ref 7–23)
C TRACH RRNA SPEC QL NAA+PROBE: SIGNIFICANT CHANGE UP
CALCIUM SERPL-MCNC: 8.2 MG/DL — LOW (ref 8.4–10.5)
CHLORIDE SERPL-SCNC: 103 MMOL/L — SIGNIFICANT CHANGE UP (ref 96–108)
CO2 SERPL-SCNC: 26 MMOL/L — SIGNIFICANT CHANGE UP (ref 22–31)
CREAT SERPL-MCNC: 0.69 MG/DL — SIGNIFICANT CHANGE UP (ref 0.5–1.3)
CULTURE RESULTS: NO GROWTH — SIGNIFICANT CHANGE UP
CULTURE RESULTS: NO GROWTH — SIGNIFICANT CHANGE UP
GLUCOSE SERPL-MCNC: 109 MG/DL — HIGH (ref 70–99)
HAV IGM SER-ACNC: SIGNIFICANT CHANGE UP
HBV CORE IGM SER-ACNC: SIGNIFICANT CHANGE UP
HBV SURFACE AG SER-ACNC: SIGNIFICANT CHANGE UP
HCT VFR BLD CALC: 29.5 % — LOW (ref 34.5–45)
HCV AB S/CO SERPL IA: 0.07 S/CO — SIGNIFICANT CHANGE UP
HCV AB SERPL-IMP: SIGNIFICANT CHANGE UP
HGB BLD-MCNC: 9.6 G/DL — LOW (ref 11.5–15.5)
HIV 1+2 AB+HIV1 P24 AG SERPL QL IA: SIGNIFICANT CHANGE UP
MCHC RBC-ENTMCNC: 28.3 PG — SIGNIFICANT CHANGE UP (ref 27–34)
MCHC RBC-ENTMCNC: 32.5 G/DL — SIGNIFICANT CHANGE UP (ref 32–36)
MCV RBC AUTO: 87 FL — SIGNIFICANT CHANGE UP (ref 80–100)
N GONORRHOEA RRNA SPEC QL NAA+PROBE: SIGNIFICANT CHANGE UP
PLATELET # BLD AUTO: 267 K/UL — SIGNIFICANT CHANGE UP (ref 150–400)
POTASSIUM SERPL-MCNC: 3.3 MMOL/L — LOW (ref 3.5–5.3)
POTASSIUM SERPL-SCNC: 3.3 MMOL/L — LOW (ref 3.5–5.3)
RBC # BLD: 3.39 M/UL — LOW (ref 3.8–5.2)
RBC # FLD: 13.6 % — SIGNIFICANT CHANGE UP (ref 10.3–16.9)
SODIUM SERPL-SCNC: 140 MMOL/L — SIGNIFICANT CHANGE UP (ref 135–145)
SPECIMEN SOURCE: SIGNIFICANT CHANGE UP
WBC # BLD: 9.6 K/UL — SIGNIFICANT CHANGE UP (ref 3.8–10.5)
WBC # FLD AUTO: 9.6 K/UL — SIGNIFICANT CHANGE UP (ref 3.8–10.5)

## 2018-02-05 RX ORDER — IBUPROFEN 200 MG
1 TABLET ORAL
Qty: 40 | Refills: 0 | OUTPATIENT
Start: 2018-02-05 | End: 2018-02-14

## 2018-02-05 RX ORDER — POTASSIUM CHLORIDE 20 MEQ
40 PACKET (EA) ORAL ONCE
Qty: 0 | Refills: 0 | Status: COMPLETED | OUTPATIENT
Start: 2018-02-05 | End: 2018-02-05

## 2018-02-05 RX ORDER — METRONIDAZOLE 500 MG
1 TABLET ORAL
Qty: 42 | Refills: 0 | OUTPATIENT
Start: 2018-02-05 | End: 2018-02-18

## 2018-02-05 RX ADMIN — Medication 500 MILLIGRAM(S): at 05:26

## 2018-02-05 RX ADMIN — Medication 500 MILLIGRAM(S): at 13:18

## 2018-02-05 RX ADMIN — Medication 40 MILLIEQUIVALENT(S): at 10:43

## 2018-02-05 RX ADMIN — Medication 100 GRAM(S): at 05:30

## 2018-02-05 RX ADMIN — Medication 600 MILLIGRAM(S): at 06:10

## 2018-02-05 RX ADMIN — Medication 600 MILLIGRAM(S): at 05:20

## 2018-02-05 RX ADMIN — Medication 110 MILLIGRAM(S): at 06:20

## 2018-02-05 NOTE — PROGRESS NOTE ADULT - SUBJECTIVE AND OBJECTIVE BOX
Pt evaluated at bedside.  She reports pain is well controlled with OPM. She is tolerating a regular diet. Patient reports less diarrhea. Voiding and ambulating. She denies HA, dizzines, SOB, CP, palpitations, n/v.    Vital Signs Last 24 Hrs  T(C): 37 (05 Feb 2018 04:45), Max: 37.7 (04 Feb 2018 19:04)  T(F): 98.6 (05 Feb 2018 04:45), Max: 99.8 (04 Feb 2018 19:04)  HR: 91 (05 Feb 2018 04:45) (87 - 108)  BP: 128/65 (05 Feb 2018 04:45) (89/51 - 128/65)  BP(mean): --  RR: 16 (05 Feb 2018 04:45) (15 - 16)  SpO2: 95% (05 Feb 2018 04:45) (95% - 97%)    GA: A&Ox3  CV: RRR, no MRG  Pulm: CTAB  Abd: +BS, soft, nontender, mild to moderately distended, no rebound or guarding  Extrem: SCDs in place    I&O's Summary    03 Feb 2018 07:01  -  04 Feb 2018 07:00  --------------------------------------------------------  IN: 3040 mL / OUT: 1150 mL / NET: 1890 mL    04 Feb 2018 07:01  -  04 Feb 2018 16:11  --------------------------------------------------------  IN: 0 mL / OUT: 400 mL / NET: -400 mL                          9.9    9.0   )-----------( 244      ( 04 Feb 2018 07:33 )             30.7   02-03    139  |  103  |  4<L>  ----------------------------<  113<H>  3.6   |  21<L>  |  0.67    Ca    7.8<L>      03 Feb 2018 14:10  Mg     1.9     02-03    TPro  6.7  /  Alb  3.3  /  TBili  0.8  /  DBili  x   /  AST  14  /  ALT  11  /  AlkPhos  44  02-03

## 2018-02-05 NOTE — DISCHARGE NOTE ADULT - CARE PLAN
Principal Discharge DX:	Pelvic inflammatory disease (PID)  Goal:	uncomplicated, healthy recovery  Assessment and plan of treatment:	resume activities of daily living, no sexual intercourse; continue Doxyxlcine and Flagyl for 14 days. Follow-up with Dr. Mir in the office in 1 week.

## 2018-02-05 NOTE — DISCHARGE NOTE ADULT - PLAN OF CARE
uncomplicated, healthy recovery resume activities of daily living, no sexual intercourse; continue Doxyxlcine and Flagyl for 14 days. Follow-up with Dr. Mir in the office in 1 week.

## 2018-02-05 NOTE — DISCHARGE NOTE ADULT - HOSPITAL COURSE
37 year old who presented with severe abdominal pain. Diagnostic laparoscopy performed with pus seen in the abdomen. No evidence of ovarian torsion or infected ovarian cyst. General surgery consulted with appendectomy performed. Postoperatively, uncomplicated surgical course. Patient has remained afebrile and vital signs stable. She received 48 hours of IV antibiotics for suspected pelvic inflammatory disease. Patient discharged in stable condition with plan for outpatient monitoring.

## 2018-02-05 NOTE — PROGRESS NOTE ADULT - ASSESSMENT
37y Female POD#2 s/p diagnostic laparoscopy with abdominal infection concerning for PIC. On Doxy, cefotetan and flagyl.                                       1. Neuro/Pain:  OPM  2  CV:   VS per routine  3. Pulm: Encourage ISS  4. GI: Reg diet  5. :  voiding  6. Heme: stable  7. ID: suspected PID on cefotetan, doxy, flagyl. Diarrhea possible 2/2 abx, will send c diff.   8. DVT ppx: SCDs  9. Dispo: Likely POD#2

## 2018-02-05 NOTE — DISCHARGE NOTE ADULT - PATIENT PORTAL LINK FT
You can access the [facility] Patient Portal, offered by [facility], by registering with the following website: [email or web address]

## 2018-02-05 NOTE — DISCHARGE NOTE ADULT - MEDICATION SUMMARY - MEDICATIONS TO TAKE
I will START or STAY ON the medications listed below when I get home from the hospital:    metroNIDAZOLE 500 mg oral tablet  -- 1 tab(s) by mouth every 8 hours  -- Indication: For Pelvic inflammatory disease (PID)    ibuprofen 600 mg oral tablet  -- 1 tab(s) by mouth every 6 hours, As needed, mild pain  -- Indication: For abdominal pain     doxycycline monohydrate 100 mg oral capsule  -- 1 cap(s) by mouth 2 times a day   -- Avoid prolonged or excessive exposure to direct and/or artificial sunlight while taking this medication.  Do not take this drug if you are pregnant.  Finish all this medication unless otherwise directed by prescriber.  Medication should be taken with plenty of water.    -- Indication: For Pelvic inflammatory disease (PID)

## 2018-02-05 NOTE — DISCHARGE NOTE ADULT - CARE PROVIDER_API CALL
Lon Little), Dk [facility] Obstetrics and Gynecology  [address] [place] [id number]  Phone: [phone]  Fax: [phone]

## 2018-02-06 LAB
SURGICAL PATHOLOGY STUDY: SIGNIFICANT CHANGE UP
T PALLIDUM AB TITR SER: NEGATIVE — SIGNIFICANT CHANGE UP

## 2018-02-06 PROCEDURE — 86780 TREPONEMA PALLIDUM: CPT

## 2018-02-06 PROCEDURE — 84702 CHORIONIC GONADOTROPIN TEST: CPT

## 2018-02-06 PROCEDURE — 83690 ASSAY OF LIPASE: CPT

## 2018-02-06 PROCEDURE — 96374 THER/PROPH/DIAG INJ IV PUSH: CPT

## 2018-02-06 PROCEDURE — 86900 BLOOD TYPING SEROLOGIC ABO: CPT

## 2018-02-06 PROCEDURE — 87075 CULTR BACTERIA EXCEPT BLOOD: CPT

## 2018-02-06 PROCEDURE — 87389 HIV-1 AG W/HIV-1&-2 AB AG IA: CPT

## 2018-02-06 PROCEDURE — 87070 CULTURE OTHR SPECIMN AEROBIC: CPT

## 2018-02-06 PROCEDURE — 80074 ACUTE HEPATITIS PANEL: CPT

## 2018-02-06 PROCEDURE — 82803 BLOOD GASES ANY COMBINATION: CPT

## 2018-02-06 PROCEDURE — 86850 RBC ANTIBODY SCREEN: CPT

## 2018-02-06 PROCEDURE — 85027 COMPLETE CBC AUTOMATED: CPT

## 2018-02-06 PROCEDURE — 36415 COLL VENOUS BLD VENIPUNCTURE: CPT

## 2018-02-06 PROCEDURE — 83735 ASSAY OF MAGNESIUM: CPT

## 2018-02-06 PROCEDURE — 80048 BASIC METABOLIC PNL TOTAL CA: CPT

## 2018-02-06 PROCEDURE — 83605 ASSAY OF LACTIC ACID: CPT

## 2018-02-06 PROCEDURE — 86901 BLOOD TYPING SEROLOGIC RH(D): CPT

## 2018-02-06 PROCEDURE — 87324 CLOSTRIDIUM AG IA: CPT

## 2018-02-06 PROCEDURE — 85025 COMPLETE CBC W/AUTO DIFF WBC: CPT

## 2018-02-06 PROCEDURE — 99285 EMERGENCY DEPT VISIT HI MDM: CPT | Mod: 25

## 2018-02-06 PROCEDURE — 80053 COMPREHEN METABOLIC PANEL: CPT

## 2018-02-06 PROCEDURE — 93005 ELECTROCARDIOGRAM TRACING: CPT

## 2018-02-06 PROCEDURE — 87449 NOS EACH ORGANISM AG IA: CPT

## 2018-02-08 LAB
CULTURE RESULTS: SIGNIFICANT CHANGE UP
CULTURE RESULTS: SIGNIFICANT CHANGE UP
SPECIMEN SOURCE: SIGNIFICANT CHANGE UP
SPECIMEN SOURCE: SIGNIFICANT CHANGE UP

## 2020-09-15 NOTE — DISCHARGE NOTE ADULT - CLICK TO LAUNCH ORM
September 15, 2020     Patient: Enrique Cedeño   YOB: 1984   Date of Visit: 9/15/2020       To Whom it May Concern:    Enrique Cedeño is under my professional care  She was seen in my office on 9/15/2020  She may return to work on 09/15/2020  If you have any questions or concerns, please don't hesitate to call           Sincerely,          Ximena Us PA-C        CC: No Recipients
.
